# Patient Record
Sex: FEMALE | Race: WHITE | NOT HISPANIC OR LATINO | URBAN - METROPOLITAN AREA
[De-identification: names, ages, dates, MRNs, and addresses within clinical notes are randomized per-mention and may not be internally consistent; named-entity substitution may affect disease eponyms.]

---

## 2019-07-28 ENCOUNTER — INPATIENT (INPATIENT)
Facility: HOSPITAL | Age: 39
LOS: 0 days | Discharge: HOME | End: 2019-07-29
Attending: SPECIALIST | Admitting: SPECIALIST
Payer: COMMERCIAL

## 2019-07-28 ENCOUNTER — TRANSCRIPTION ENCOUNTER (OUTPATIENT)
Age: 39
End: 2019-07-28

## 2019-07-28 VITALS
TEMPERATURE: 98 F | RESPIRATION RATE: 16 BRPM | SYSTOLIC BLOOD PRESSURE: 124 MMHG | HEART RATE: 71 BPM | DIASTOLIC BLOOD PRESSURE: 71 MMHG

## 2019-07-28 DIAGNOSIS — K08.499 PARTIAL LOSS OF TEETH DUE TO OTHER SPECIFIED CAUSE, UNSPECIFIED CLASS: Chronic | ICD-10-CM

## 2019-07-28 DIAGNOSIS — D36.9 BENIGN NEOPLASM, UNSPECIFIED SITE: Chronic | ICD-10-CM

## 2019-07-28 DIAGNOSIS — Z3A.00 WEEKS OF GESTATION OF PREGNANCY NOT SPECIFIED: ICD-10-CM

## 2019-07-28 DIAGNOSIS — O26.899 OTHER SPECIFIED PREGNANCY RELATED CONDITIONS, UNSPECIFIED TRIMESTER: ICD-10-CM

## 2019-07-28 DIAGNOSIS — N83.209 UNSPECIFIED OVARIAN CYST, UNSPECIFIED SIDE: Chronic | ICD-10-CM

## 2019-07-28 LAB
ANTIBODY ID 1_1: SIGNIFICANT CHANGE UP
BASOPHILS # BLD AUTO: 0.04 K/UL — SIGNIFICANT CHANGE UP (ref 0–0.2)
BASOPHILS NFR BLD AUTO: 0.2 % — SIGNIFICANT CHANGE UP (ref 0–2)
BLD GP AB SCN SERPL QL: POSITIVE — SIGNIFICANT CHANGE UP
DAT C3-SP REAG RBC QL: NEGATIVE — SIGNIFICANT CHANGE UP
DAT POLY-SP REAG RBC QL: NEGATIVE — SIGNIFICANT CHANGE UP
DIRECT COOMBS IGG: NEGATIVE — SIGNIFICANT CHANGE UP
EOSINOPHIL # BLD AUTO: 0.02 K/UL — SIGNIFICANT CHANGE UP (ref 0–0.5)
EOSINOPHIL NFR BLD AUTO: 0.1 % — SIGNIFICANT CHANGE UP (ref 0–6)
HCT VFR BLD CALC: 37.5 % — SIGNIFICANT CHANGE UP (ref 34.5–45)
HGB BLD-MCNC: 12.6 G/DL — SIGNIFICANT CHANGE UP (ref 11.5–15.5)
IMM GRANULOCYTES NFR BLD AUTO: 1.4 % — SIGNIFICANT CHANGE UP (ref 0–1.5)
LYMPHOCYTES # BLD AUTO: 15.5 % — SIGNIFICANT CHANGE UP (ref 13–44)
LYMPHOCYTES # BLD AUTO: 2.52 K/UL — SIGNIFICANT CHANGE UP (ref 1–3.3)
MCHC RBC-ENTMCNC: 32.1 PG — SIGNIFICANT CHANGE UP (ref 27–34)
MCHC RBC-ENTMCNC: 33.6 % — SIGNIFICANT CHANGE UP (ref 32–36)
MCV RBC AUTO: 95.4 FL — SIGNIFICANT CHANGE UP (ref 80–100)
MONOCYTES # BLD AUTO: 0.82 K/UL — SIGNIFICANT CHANGE UP (ref 0–0.9)
MONOCYTES NFR BLD AUTO: 5 % — SIGNIFICANT CHANGE UP (ref 2–14)
NEUTROPHILS # BLD AUTO: 12.62 K/UL — HIGH (ref 1.8–7.4)
NEUTROPHILS NFR BLD AUTO: 77.8 % — HIGH (ref 43–77)
NRBC # FLD: 0 K/UL — SIGNIFICANT CHANGE UP (ref 0–0)
PLATELET # BLD AUTO: 232 K/UL — SIGNIFICANT CHANGE UP (ref 150–400)
PMV BLD: 11.3 FL — SIGNIFICANT CHANGE UP (ref 7–13)
RBC # BLD: 3.93 M/UL — SIGNIFICANT CHANGE UP (ref 3.8–5.2)
RBC # FLD: 13.6 % — SIGNIFICANT CHANGE UP (ref 10.3–14.5)
RH IG SCN BLD-IMP: NEGATIVE — SIGNIFICANT CHANGE UP
RH IG SCN BLD-IMP: NEGATIVE — SIGNIFICANT CHANGE UP
WBC # BLD: 16.25 K/UL — HIGH (ref 3.8–10.5)
WBC # FLD AUTO: 16.25 K/UL — HIGH (ref 3.8–10.5)

## 2019-07-28 PROCEDURE — 86077 PHYS BLOOD BANK SERV XMATCH: CPT

## 2019-07-28 RX ORDER — OXYTOCIN 10 UNIT/ML
333.33 VIAL (ML) INJECTION
Qty: 20 | Refills: 0 | Status: DISCONTINUED | OUTPATIENT
Start: 2019-07-28 | End: 2019-07-28

## 2019-07-28 RX ORDER — SODIUM CHLORIDE 9 MG/ML
3 INJECTION INTRAMUSCULAR; INTRAVENOUS; SUBCUTANEOUS EVERY 8 HOURS
Refills: 0 | Status: DISCONTINUED | OUTPATIENT
Start: 2019-07-28 | End: 2019-07-29

## 2019-07-28 RX ORDER — IBUPROFEN 200 MG
600 TABLET ORAL EVERY 6 HOURS
Refills: 0 | Status: COMPLETED | OUTPATIENT
Start: 2019-07-28 | End: 2020-06-25

## 2019-07-28 RX ORDER — SIMETHICONE 80 MG/1
80 TABLET, CHEWABLE ORAL EVERY 4 HOURS
Refills: 0 | Status: DISCONTINUED | OUTPATIENT
Start: 2019-07-28 | End: 2019-07-29

## 2019-07-28 RX ORDER — AER TRAVELER 0.5 G/1
1 SOLUTION RECTAL; TOPICAL EVERY 4 HOURS
Refills: 0 | Status: DISCONTINUED | OUTPATIENT
Start: 2019-07-28 | End: 2019-07-29

## 2019-07-28 RX ORDER — PRAMOXINE HYDROCHLORIDE 150 MG/15G
1 AEROSOL, FOAM RECTAL EVERY 4 HOURS
Refills: 0 | Status: DISCONTINUED | OUTPATIENT
Start: 2019-07-28 | End: 2019-07-29

## 2019-07-28 RX ORDER — OXYCODONE HYDROCHLORIDE 5 MG/1
5 TABLET ORAL
Refills: 0 | Status: DISCONTINUED | OUTPATIENT
Start: 2019-07-28 | End: 2019-07-29

## 2019-07-28 RX ORDER — MAGNESIUM HYDROXIDE 400 MG/1
30 TABLET, CHEWABLE ORAL
Refills: 0 | Status: DISCONTINUED | OUTPATIENT
Start: 2019-07-28 | End: 2019-07-29

## 2019-07-28 RX ORDER — CITRIC ACID/SODIUM CITRATE 300-500 MG
15 SOLUTION, ORAL ORAL EVERY 6 HOURS
Refills: 0 | Status: DISCONTINUED | OUTPATIENT
Start: 2019-07-28 | End: 2019-07-28

## 2019-07-28 RX ORDER — ACETAMINOPHEN 500 MG
975 TABLET ORAL
Refills: 0 | Status: DISCONTINUED | OUTPATIENT
Start: 2019-07-28 | End: 2019-07-29

## 2019-07-28 RX ORDER — DIBUCAINE 1 %
1 OINTMENT (GRAM) RECTAL EVERY 6 HOURS
Refills: 0 | Status: DISCONTINUED | OUTPATIENT
Start: 2019-07-28 | End: 2019-07-29

## 2019-07-28 RX ORDER — ONDANSETRON 8 MG/1
4 TABLET, FILM COATED ORAL ONCE
Refills: 0 | Status: DISCONTINUED | OUTPATIENT
Start: 2019-07-28 | End: 2019-07-29

## 2019-07-28 RX ORDER — TETANUS TOXOID, REDUCED DIPHTHERIA TOXOID AND ACELLULAR PERTUSSIS VACCINE, ADSORBED 5; 2.5; 8; 8; 2.5 [IU]/.5ML; [IU]/.5ML; UG/.5ML; UG/.5ML; UG/.5ML
0.5 SUSPENSION INTRAMUSCULAR ONCE
Refills: 0 | Status: DISCONTINUED | OUTPATIENT
Start: 2019-07-28 | End: 2019-07-29

## 2019-07-28 RX ORDER — BENZOCAINE 10 %
1 GEL (GRAM) MUCOUS MEMBRANE EVERY 6 HOURS
Refills: 0 | Status: DISCONTINUED | OUTPATIENT
Start: 2019-07-28 | End: 2019-07-29

## 2019-07-28 RX ORDER — DOCUSATE SODIUM 100 MG
100 CAPSULE ORAL
Refills: 0 | Status: DISCONTINUED | OUTPATIENT
Start: 2019-07-28 | End: 2019-07-29

## 2019-07-28 RX ORDER — KETOROLAC TROMETHAMINE 30 MG/ML
30 SYRINGE (ML) INJECTION ONCE
Refills: 0 | Status: DISCONTINUED | OUTPATIENT
Start: 2019-07-28 | End: 2019-07-28

## 2019-07-28 RX ORDER — LANOLIN
1 OINTMENT (GRAM) TOPICAL EVERY 6 HOURS
Refills: 0 | Status: DISCONTINUED | OUTPATIENT
Start: 2019-07-28 | End: 2019-07-29

## 2019-07-28 RX ORDER — OXYCODONE HYDROCHLORIDE 5 MG/1
5 TABLET ORAL ONCE
Refills: 0 | Status: DISCONTINUED | OUTPATIENT
Start: 2019-07-28 | End: 2019-07-29

## 2019-07-28 RX ORDER — GLYCERIN ADULT
1 SUPPOSITORY, RECTAL RECTAL AT BEDTIME
Refills: 0 | Status: DISCONTINUED | OUTPATIENT
Start: 2019-07-28 | End: 2019-07-29

## 2019-07-28 RX ORDER — DIPHENHYDRAMINE HCL 50 MG
25 CAPSULE ORAL EVERY 6 HOURS
Refills: 0 | Status: DISCONTINUED | OUTPATIENT
Start: 2019-07-28 | End: 2019-07-29

## 2019-07-28 RX ORDER — HYDROCORTISONE 1 %
1 OINTMENT (GRAM) TOPICAL EVERY 6 HOURS
Refills: 0 | Status: DISCONTINUED | OUTPATIENT
Start: 2019-07-28 | End: 2019-07-29

## 2019-07-28 RX ORDER — SODIUM CHLORIDE 9 MG/ML
1000 INJECTION, SOLUTION INTRAVENOUS
Refills: 0 | Status: DISCONTINUED | OUTPATIENT
Start: 2019-07-28 | End: 2019-07-28

## 2019-07-28 RX ORDER — IBUPROFEN 200 MG
600 TABLET ORAL EVERY 6 HOURS
Refills: 0 | Status: DISCONTINUED | OUTPATIENT
Start: 2019-07-28 | End: 2019-07-29

## 2019-07-28 RX ADMIN — Medication 975 MILLIGRAM(S): at 14:00

## 2019-07-28 RX ADMIN — Medication 30 MILLIGRAM(S): at 11:23

## 2019-07-28 RX ADMIN — Medication 975 MILLIGRAM(S): at 21:08

## 2019-07-28 RX ADMIN — Medication 100 MILLIGRAM(S): at 20:25

## 2019-07-28 RX ADMIN — Medication 975 MILLIGRAM(S): at 20:11

## 2019-07-28 RX ADMIN — Medication 600 MILLIGRAM(S): at 17:46

## 2019-07-28 RX ADMIN — Medication 30 MILLIGRAM(S): at 12:36

## 2019-07-28 RX ADMIN — Medication 975 MILLIGRAM(S): at 14:37

## 2019-07-28 NOTE — OB PROVIDER H&P - ASSESSMENT
Pt of Dr. Conway: 40yo  at 39.2wks, presents to triage with contractions. Denies any leakage of fluid but reports some vaginal spotting. Reports good fetal movement. Pt desires to TOLAC.    Allergies: denies  Meds: PNV  PMH: denies  PSH: dermoid cyst removal,   OBGYN  sab x 1  2/15/16, C/S, FT, breech, 7#11  Current AP complications: denies    Assessment/Plan    VS: /71, HR 71, T 36.6  Abd soft, nontender, gravid  SVE: 3/80/-2  TAS: vertex, anterior placenta, BPP=8/8; CHICO=9.68cm  NST: , moderate variability, accels, no decel  Ctx q 2-5min on toco    -D/w Dr. Chance  -Pt desires to TOLAC, does not want to ambulate  -Routine labs  -GBS neg status  -Pain mgmnt as per pt request

## 2019-07-28 NOTE — DISCHARGE NOTE OB - PATIENT PORTAL LINK FT
You can access the [facility] Portal, offered by [facility], by registering with the following website: [email or web address]

## 2019-07-28 NOTE — OB PROVIDER TRIAGE NOTE - ADDITIONAL INSTRUCTIONS
-D/w Dr. Chance  -Pt to ambulate for about an hour or two and return to triage for reevalution  -Labor precautions and fetal kick counts reviewed; pt to return if vb, lof, pressure, increased contractions or decreased movement

## 2019-07-28 NOTE — OB PROVIDER TRIAGE NOTE - NSHPPHYSICALEXAM_GEN_ALL_CORE
VS: /71, HR 71, T 36.6  Abd soft, nontender, gravid  SVE: 3/80/-2  TAS: vertex, anterior placenta, BPP=8/8; CHICO=9.68cm  NST: , moderate variability, accels, no decel  Ctx q 2-5min on toco

## 2019-07-28 NOTE — OB PROVIDER TRIAGE NOTE - NSOBPROVIDERNOTE_OBGYN_ALL_OB_FT
Pt of Dr. Conway: 40yo  at 39.2wks, presents to triage with contractions. Denies any leakage of fluid but reports some vaginal spotting. Reports good fetal movement. Pt desires to TOLAC.    Allergies: denies  Meds: PNV  PMH: denies  PSH: dermoid cyst removal,   OBGYN  sab x 1  2/15/16, C/S, FT, breech, 7#11  Current AP complications: denies    Assessment/Plan    VS: /71, HR 71, T 36.6  Abd soft, nontender, gravid  SVE: 3/80/-2  TAS: vertex, anterior placenta, BPP=8/8; CHICO=9.68cm  NST: , moderate variability, accels, no decel  Ctx q 2-5min on toco Pt of Dr. Conway: 38yo  at 39.2wks, presents to triage with contractions. Denies any leakage of fluid but reports some vaginal spotting. Reports good fetal movement. Pt desires to TOLAC.    Allergies: denies  Meds: PNV  PMH: denies  PSH: dermoid cyst removal,   OBGYN  sab x 1  2/15/16, C/S, FT, breech, 7#11  Current AP complications: denies    Assessment/Plan    VS: /71, HR 71, T 36.6  Abd soft, nontender, gravid  SVE: 3/80/-2  TAS: vertex, anterior placenta, BPP=8/8; CHICO=9.68cm  NST: , moderate variability, accels, no decel  Ctx q 2-5min on toco    -D/w Dr. Chance  -Pt to ambulate for about an hour or two and return to triage for reevalution  -Labor precautions and fetal kick counts reviewed; pt to return if vb, lof, pressure, increased contractions or decreased movement

## 2019-07-28 NOTE — DISCHARGE NOTE OB - CARE PLAN
Principal Discharge DX:	Vaginal birth after   Goal:	home  Assessment and plan of treatment:	rtc 6 wks

## 2019-07-28 NOTE — OB PROVIDER TRIAGE NOTE - HISTORY OF PRESENT ILLNESS
Pt of Dr. Conway: 38yo  at 39.2wks, presents to triage with contractions. Denies any leakage of fluid but reports some vaginal spotting. Reports good fetal movement. Pt desires to TOLAC.    Allergies: denies  Meds: PNV  PMH: denies  PSH: dermoid cyst removal,   OBGYN  sab x 1  2/15/16, C/S, FT, breech, 7#11  Current AP complications: denies

## 2019-07-28 NOTE — CHART NOTE - NSCHARTNOTEFT_GEN_A_CORE
PA Note     called to examine patient more uncomfortable and contrxs q 1-3 minutes  requesting epidural     NST reactive with contrxs q 1-5 minutes     VE 4-5/70/-3 vtx  bulging membranes    PT is  for TOLAC     case d/w Dr Chance    approved for epidural    continued observation and monitoring   Devon CABRERA

## 2019-07-28 NOTE — DISCHARGE NOTE OB - CARE PROVIDER_API CALL
Yfn Chance)  Obstetrics and Gynecology  [address] [facility], [address] [place] [address]  Phone: [phone]  Fax: [phone]  Follow Up Time:

## 2019-07-28 NOTE — CHART NOTE - NSCHARTNOTEFT_GEN_A_CORE
PA Note    patient arrived to LDR 12, uncomfortable    VS  T(C): 36.6 (19 @ 05:48)  HR: 143 (19 @ 07:57)  BP: 124/71 (19 @ 05:55)  RR: 16 (19 @ 05:48)  SpO2: 94% (19 @ 07:52)    125/mod teena/+accels/no decels  [place] q 2-3min  5-6/80/-2    cont efm/toco  cont current management   anticipated  dr Chance in house  rachel garcia

## 2019-07-29 VITALS
HEART RATE: 78 BPM | TEMPERATURE: 98 F | SYSTOLIC BLOOD PRESSURE: 121 MMHG | RESPIRATION RATE: 18 BRPM | DIASTOLIC BLOOD PRESSURE: 79 MMHG | OXYGEN SATURATION: 99 %

## 2019-07-29 RX ADMIN — Medication 975 MILLIGRAM(S): at 05:29

## 2019-07-29 RX ADMIN — Medication 600 MILLIGRAM(S): at 01:01

## 2019-07-29 RX ADMIN — PRAMOXINE HYDROCHLORIDE 1 APPLICATION(S): 150 AEROSOL, FOAM RECTAL at 00:20

## 2019-07-29 RX ADMIN — Medication 600 MILLIGRAM(S): at 00:19

## 2019-07-29 RX ADMIN — Medication 1 APPLICATION(S): at 00:20

## 2019-07-29 RX ADMIN — Medication 975 MILLIGRAM(S): at 04:51

## 2019-07-29 RX ADMIN — Medication 100 MILLIGRAM(S): at 10:35

## 2019-07-29 RX ADMIN — Medication 600 MILLIGRAM(S): at 07:20

## 2019-07-29 NOTE — PROGRESS NOTE ADULT - SUBJECTIVE AND OBJECTIVE BOX
S: Patient doing well.     Pain controlled.  +OOB.  +void.    Tolerating PO.  Lochia WNL.    O: Vital Signs Last 24 Hrs  T(C): 36.8 (2019 06:09), Max: 36.9 (2019 18:07)  T(F): 98.2 (2019 06:09), Max: 98.5 (2019 18:07)  HR: 78 (2019 06:09) (67 - 143)  BP: 121/79 (2019 06:09) (110/58 - 184/99)  BP(mean): --  RR: 18 (2019 06:09) (18 - 20)  SpO2: 99% (2019 06:09) (80% - 100%)      Pt without complaints  vital signs stable  Abdomen soft  fundus firm, non tender  extremities non tender  lochia wnl    Patient doing well  Routine post partum care    Labs:                        12.6   16.25 )-----------( 232      ( 2019 07:56 )             37.5       ABO Interpretation: A ( @ 08:00)    Rh Interpretation: Negative ( @ 08:00)    Antibody Screen Positive      A: 39y s/p  doing well.   -Continue routine postpartum care.  -Discharge planned for today as requested

## 2019-07-30 LAB — T PALLIDUM AB TITR SER: NEGATIVE — SIGNIFICANT CHANGE UP

## 2021-11-17 NOTE — OB PROVIDER TRIAGE NOTE - NS_EFW_OBGYN_ALL_OB_FT
Emi called very upset because she did receive a bill in the mail and was wondering why we told her that her insurance company did not except her [employer]. Writer told her she would have to call the billing department, she said she did and it looked like everything was coded correctly. Writer asked if she called her Insurance to see, she said she did and Dr Brink is not covered, so why did we tell her it was covered. Writer told her personally we do except [employer] [place] but we always tell patients to call their insurance companies first to make sure, we normally give out the NIP numbers.   She is wanting to talk with Dr Brink about this , her bill is over $6,000.00     [id number]

## 2022-09-29 NOTE — OB RN PATIENT PROFILE - POST PARTUM DEPRESSION SCREEN OB 4
FUTURE VISIT INFORMATION      FUTURE VISIT INFORMATION:    Date: 10.12.22    Time: 2:00    Location: Telephone  REFERRAL INFORMATION:    Referring provider:  Leeann    Referring providers clinic:  Derm    Reason for visit/diagnosis  Melanoma In Situ, Right Shoulder need melanoma slot    RECORDS REQUESTED FROM:       Clinic name Comments Records Status Imaging Status   Derm 9.23.22  Path # [id number] [facility]                                        no